# Patient Record
Sex: FEMALE | Race: BLACK OR AFRICAN AMERICAN | NOT HISPANIC OR LATINO | URBAN - METROPOLITAN AREA
[De-identification: names, ages, dates, MRNs, and addresses within clinical notes are randomized per-mention and may not be internally consistent; named-entity substitution may affect disease eponyms.]

---

## 2017-03-06 ENCOUNTER — EMERGENCY (EMERGENCY)
Facility: HOSPITAL | Age: 82
LOS: 1 days | Discharge: HOME | End: 2017-03-06
Attending: EMERGENCY MEDICINE
Payer: COMMERCIAL

## 2017-03-06 VITALS
SYSTOLIC BLOOD PRESSURE: 181 MMHG | OXYGEN SATURATION: 100 % | DIASTOLIC BLOOD PRESSURE: 61 MMHG | HEIGHT: 62 IN | TEMPERATURE: 98 F | WEIGHT: 128.09 LBS | HEART RATE: 100 BPM | RESPIRATION RATE: 20 BRPM

## 2017-03-06 VITALS
DIASTOLIC BLOOD PRESSURE: 74 MMHG | SYSTOLIC BLOOD PRESSURE: 164 MMHG | RESPIRATION RATE: 18 BRPM | TEMPERATURE: 98 F | HEART RATE: 89 BPM | OXYGEN SATURATION: 100 %

## 2017-03-06 DIAGNOSIS — M54.2 CERVICALGIA: ICD-10-CM

## 2017-03-06 DIAGNOSIS — Z91.040 LATEX ALLERGY STATUS: ICD-10-CM

## 2017-03-06 PROCEDURE — 99283 EMERGENCY DEPT VISIT LOW MDM: CPT

## 2017-03-06 RX ORDER — ACETAMINOPHEN 500 MG
650 TABLET ORAL ONCE
Qty: 0 | Refills: 0 | Status: COMPLETED | OUTPATIENT
Start: 2017-03-06 | End: 2017-03-06

## 2017-03-06 RX ORDER — METHOCARBAMOL 500 MG/1
1 TABLET, FILM COATED ORAL
Qty: 30 | Refills: 0 | OUTPATIENT
Start: 2017-03-06

## 2017-03-06 RX ORDER — DIAZEPAM 5 MG
5 TABLET ORAL ONCE
Qty: 0 | Refills: 0 | Status: DISCONTINUED | OUTPATIENT
Start: 2017-03-06 | End: 2017-03-06

## 2017-03-06 RX ORDER — METHOCARBAMOL 500 MG/1
750 TABLET, FILM COATED ORAL ONCE
Qty: 0 | Refills: 0 | Status: COMPLETED | OUTPATIENT
Start: 2017-03-06 | End: 2017-03-06

## 2017-03-06 RX ADMIN — METHOCARBAMOL 750 MILLIGRAM(S): 500 TABLET, FILM COATED ORAL at 12:33

## 2017-03-06 RX ADMIN — Medication 650 MILLIGRAM(S): at 12:13

## 2017-03-06 RX ADMIN — Medication 650 MILLIGRAM(S): at 11:43

## 2017-03-06 RX ADMIN — Medication 5 MILLIGRAM(S): at 11:43

## 2017-03-06 NOTE — ED PROVIDER NOTE - NS ED MD SCRIBE ATTENDING SCRIBE SECTIONS
PHYSICAL EXAM/DISPOSITION/HIV/VITAL SIGNS( Pullset)/HISTORY OF PRESENT ILLNESS/REVIEW OF SYSTEMS/PAST MEDICAL/SURGICAL/SOCIAL HISTORY

## 2017-03-06 NOTE — ED PROVIDER NOTE - OBJECTIVE STATEMENT
89 y/o F pt with no significant PMHx presents to ED c/o neck pain radiating to the base of her skull  x 3 days. Pt reports waking up with the pain 3 days ago and has tried aspirin (x1 yesterday), icing and heating her neck with no relief. Pt denies HA, nausea, change in vision, trauma, or any other complaints. NKDA.

## 2017-03-06 NOTE — ED ADULT NURSE NOTE - OBJECTIVE STATEMENT
pt a&ox3, here with c/o neck pain. pt states waking up saturday morning with neck pain. denies n/v, head trauma, fall, injury, headache, dizziness. ambulatory in ED.

## 2018-11-20 NOTE — ED ADULT TRIAGE NOTE - NS ED NURSE DIRECT TO ROOM YN
PHYSICIAN NEXT STEPS:   Review Only      CHIEF COMPLAINT:   Chief Complaint/Protocol Used: Other   Onset: referral          ASSESSMENT:   Â» Did not know what to do True   Â» Onset: referral    -------------------------------------------------------      DISPOSITION:   Disposition Recommendation: Unspecified   Patient Directed To: Unspecified   Patient Intended Action: Other         CALL NOTES:   02/21/2018 at 2:01 PM by Amy LOPEZ» Daughter request information regardding psych eval  Instructed daughter to call insurance company for referral lst of physocoans       DISPOSITION OVERRIDE/PROVIDER CONSULT:   Disposition Override: No Disposition Reached   Override Source: Unspecified   Consulted with PCP: No   Consulted with On-Call Physician: No         CALLER CONTACT INFO:   Name: LALA GOEL (Self)   Phone 1: [phone] (Home)   Phone 2: [phone] (Cell)   Phone 3: [phone] - Preferred         ENCOUNTER STARTED:   02/21/18 01:48:04 PM   ENCOUNTER ASSIGNED TO/CLOSED BY:   Amy Raman @ 02/21/18 02:02:21 PM         -------------------------------------------------------      -------------------------------------------------------  
Yes

## 2019-04-16 NOTE — ED PROVIDER NOTE - CPE EDP CARDIAC NORM
Arterial Line Placement Performed by: Anamika Jewell DO Authorized by: Anamika Jewell DO  
 
Pre-Procedure Indications:  Arterial pressure monitoring and blood sampling Preanesthetic Checklist: patient identified, risks and benefits discussed, anesthesia consent, site marked, patient being monitored, timeout performed and patient being monitored Procedure:  
Prep:  Chlorhexidine Seldinger Technique?: Yes Orientation:  Right Location:  Radial artery Catheter size:  20 G Number of attempts:  1 Cont Cardiac Output Sensor: No   
 
Assessment:  
Post-procedure:  Line secured and sterile dressing applied Patient Tolerance:  Patient tolerated the procedure well with no immediate complications normal...

## 2021-11-10 NOTE — ED PROVIDER NOTE - DATE/TIME 1
Vascular Surgery Consultation Note     Patient:  Nadira Perez   Date of birth 1952, Medical record number [id number]  Date of Visit:  11/10/2021  Consult Requester:No att. 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 Ramirez with spondylolisthesis of L5-S1.   I discussed my role in the procedure and the potential risk for bleeding and injury to nearby structures such as the iliac vein and artery.  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  We will arrange an iliac duplex at some point in the coming weeks to gain a true size on the arteries.  They are not frankly aneurysmal and needing evaluation before surgery but a accurate size would be helpful going forward.  She had an opportunity to ask questions and gain a better understanding of her procedure. I look forward to participating in her care with Dr Ramirez.    Many thanks for involving me in the care of this very pleasant patient. Should any questions or concerns arise, please don't hesitate to contact me.    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 Ramirez.  She has no history of deep vein thrombosis.  She has had an abdominal  hysterectomy however no other relevant left lower quadrant abdominal surgeries.  She does use a walker more to assist when she has back spasms.  She did states she is planning steroid injections in her wrist prior to surgery given the pain she has had since falling with a trauma.  She denies claudication.      Review of Systems   Constitutional, HEENT, cardiovascular, pulmonary, GI, , musculoskeletal, neuro, skin, endocrine and psych systems are negative, except as otherwise noted.    Physical Exam   GENERAL: Healthy, alert and no distress  EYES: Eyes grossly normal to inspection.  No discharge or erythema, or obvious scleral/conjunctival abnormalities.  RESP: No audible wheeze, cough, or visible cyanosis.  No visible retractions or increased work of breathing.    SKIN: Visible skin clear. No significant rash, abnormal pigmentation or lesions.  NEURO: Cranial nerves grossly intact.  Mentation and speech appropriate for age.  PSYCH: Mentation appears normal, affect normal/bright, judgement and insight intact, normal speech and appearance well-groomed.      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      Nadira is a 69 year old who is being evaluated via a billable video visit.      Savage visit.    How would you like to obtain your AVS? MyChart  If the video visit is dropped, the invitation should be resent by: Text to cell phone: [phone]  Will anyone else be joining your video visit? No    Video Start Time: 11 AM  Video-Visit Details    Type of service:  Video Visit    Video End Time:11:30 AM    Originating Location (pt. Location): Home    Distant Location (provider location):  [facility] [place]     Platform used for Video Visit: Savage     06-Mar-2017 13:10

## 2022-11-14 NOTE — ED PROVIDER NOTE - CPE EDP MUSC NORM
For information on Fall & Injury Prevention, visit: [email or web address] OR  [email or web address] OR  https://www.cdc.gov/steadi/patient.html
- - -